# Patient Record
Sex: MALE | Race: WHITE | NOT HISPANIC OR LATINO | Employment: FULL TIME | URBAN - METROPOLITAN AREA
[De-identification: names, ages, dates, MRNs, and addresses within clinical notes are randomized per-mention and may not be internally consistent; named-entity substitution may affect disease eponyms.]

---

## 2021-07-23 ENCOUNTER — TELEPHONE (OUTPATIENT)
Dept: RHEUMATOLOGY | Facility: CLINIC | Age: 66
End: 2021-07-23

## 2025-01-17 DIAGNOSIS — M25.562 LEFT KNEE PAIN, UNSPECIFIED CHRONICITY: ICD-10-CM

## 2025-01-17 DIAGNOSIS — M25.552 LEFT HIP PAIN: Primary | ICD-10-CM

## 2025-01-20 ENCOUNTER — TELEPHONE (OUTPATIENT)
Dept: ORTHOPEDICS | Facility: CLINIC | Age: 70
End: 2025-01-20
Payer: MEDICARE

## 2025-01-20 NOTE — TELEPHONE ENCOUNTER
Left voicemail that we need to reschedule appointment.     ----- Message from Juve sent at 1/18/2025  8:08 AM CST -----  Type:  Call    Who Called:pt   Does the patient know what this is regarding?:appt   Would the patient rather a call back or a response via [facility]? Call   Best Call Back Number: [phone]  Additional Information:

## 2025-01-27 ENCOUNTER — TELEPHONE (OUTPATIENT)
Dept: ORTHOPEDICS | Facility: CLINIC | Age: 70
End: 2025-01-27
Payer: MEDICARE

## 2025-01-27 NOTE — TELEPHONE ENCOUNTER
----- Message from Tech Tito sent at 1/27/2025  1:06 PM CST -----  Regarding: FW: Appt  Contact: [phone]  Please help patient reschedule. Thank you!  ----- Message -----  From: Ge Hill  Sent: 1/27/2025  10:53 AM CST  To: Edna Chapin Staff  Subject: Appt                                             Patient is calling to reschedule appointment due to his car in is the shop she can come next weeks he can not make in on February 5 but able to come in any other day. Please contact pt

## 2025-01-30 DIAGNOSIS — M25.562 LEFT KNEE PAIN, UNSPECIFIED CHRONICITY: ICD-10-CM

## 2025-01-30 DIAGNOSIS — M25.552 LEFT HIP PAIN: Primary | ICD-10-CM

## 2025-02-07 NOTE — PROGRESS NOTES
[address], [place] [address]   Phone [phone]  Fax [phone]           CHIEF COMPLAINT: Pain of the Right Knee and Pain of the Left Knee       HISTORY OF PRESENT ILLNESS (JN) (02/10/2025):    History of Present Illness    HPI:  Arik presents with bilateral knee pain, primarily affecting the right knee. He reports constant right knee pain, particularly when kneeling, walking upstairs, or attempting to run. His right kneecap has dislocated about 3 times within two months without apparent cause. He mentions a history of right kneecap dislocation when he was younger.    He reports swelling in the right knee, describing an incident where his hand swelled to 3 times its normal size, followed by significant knee swelling two days later. He sought medical attention for this, but no infection was found. Fluid was drained from the right knee during this visit.    Regarding the left knee, he states it's very problematic, but both knees are causing issues. He has been receiving ozone injections in both knees from a practitioner named Shahrzad, which provide temporary pain relief lasting a few days.    Arik describes pain in his left hip and leg, which started about a month and a half ago. The pain extended from his hip down the left side of his leg but resolved after about a month. 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 A biopsy of the lipoma was performed and came back non-malignant.    He mentions a history of back problems, recalling instances where he experienced severe immobility and once had to call his son for help getting up off the floor. He attributes these issues to heavy weightlifting during his teenage years and 20s.    Arik reports a recent history of severe pneumonia following COVID-19, which led to a cardiology consultation. No cardiac issues were found, but a new cardiologist prescribed medication,  "which he declined to take.    He denies any recent falls or direct trauma to the knees. He also denies any allergies.    PREVIOUS TREATMENTS:  - Ozone shots in both knees: Provide pain relief for a few days  - Knee brace with a gel [place]: Used    MEDICATIONS:  - Aleve: Never been effective for the patient  - Advil: Arik has avoided taking    ALLERGIES:  - Arik reports no known allergies. However, he had a reaction to contrast during an imaging procedure, experiencing severe nausea and dry heaving.          PAST MEDICAL HISTORY:    History reviewed. No pertinent past medical history.    Hemoglobin A1C   Date Value Ref Range Status   06/19/2021 5.9 <=6.5 % Final        PAST SURGICAL HISTORY:    Past Surgical History:   Procedure Laterality Date    HERNIA REPAIR          MEDICATIONS:    Current Outpatient Medications:     ascorbic acid,sod/zinc gluc,ox (ZINC AND C ORAL), Take by mouth., Disp: , Rfl:     ergocalciferol, vitamin D2, (VITAMIN D ORAL), Take by mouth Daily., Disp: , Rfl:     meloxicam (MOBIC) 15 MG tablet, Take 1 tablet (15 mg total) by mouth once daily. with meals, Disp: 14 tablet, Rfl: 1     There are no discontinued medications.      ALLERGIES:     Review of patient's allergies indicates:  No Known Allergies         FAMILY HISTORY:   No family history on file.        SOCIAL HISTORY:    Social History     Socioeconomic History    Marital status:    Tobacco Use    Smoking status: Never    Smokeless tobacco: Never       PHYSICAL EXAMINATION:     Estimated body mass index is 25.6 kg/m² as calculated from the following:    Height as of this encounter: 5' 6.5" (1.689 m).    Weight as of this encounter: 73 kg (161 lb).     MUSCULOSKELETAL:        Knee Exam (right extremity):   Inspection:    Skin:    Discoloration   (-)   Gross deformity   (-)    Open wounds   (-)   Surgical scars   (-)      Soft tissue:     Swelling/Knee Effusion  (+) large effusion   Muscle atrophy   (-)   Palpation tenderness:  "   Bony:     Tibial tubercle   (-)   Patella    (-)   Tibial plateau   (-)   Femoral condyle  (-)   Soft tissue:    Pes anserine bursae  (-)   Patellar tendon   (-)   Quadriceps tendon  (-)   MCL    (-)   LCL    (-)  ITB    (-)   ROM:   Affected extremity:        Extension:   0°            Flexion    130°            Pain    (+)           Crepitance   (+)           Sensory:     Normal sensation to light touch in Sa/Patel/DP/SP/T nerve distributions      Motor:                  Fires EHL/FHL/Tibialis anterior/Gastrocsoleus   Vascular:                 Foot WWP with brisk capillary refill      DP/PT pulses palpable   Special tests:    Collateral ligaments:     Stable to varus and valgus stress at 0° and 30° of knee flexion      Cruciate ligaments:   Lachman   (-)   Pivot shift   (-)   Anterior drawer   (-)   Posterior drawer  (-)  Menisci:     Medial JLT   (+)   Lateral JLT   (-)    Apley/Denias test  (-)  Patella:    Patellar grind/Olmstead's Test (+)   Patellar apprehension  (-).        IMAGING:      X-Ray Knee 1 or 2 View Right  Narrative: EXAMINATION:  XR KNEE 1 OR 2 VIEW RIGHT    CLINICAL HISTORY:  Pain in unspecified knee    TECHNIQUE:  Lateral and sunrise views of the knees.    COMPARISON:  Views of the bilateral knees from the same day.    FINDINGS:  No definitive acute fracture.  There is a severe joint space narrowing in the patellofemoral joint with bone on bone apposition.  Atherosclerotic disease.  Impression: As above.    Electronically signed by: Kodak Winters  Date:    02/10/2025  Time:    12:11  X-ray Knee Ortho Left with Flexion  Narrative: EXAMINATION:  XR KNEE ORTHO LEFT WITH FLEXION    CLINICAL HISTORY:  . Pain in left knee    TECHNIQUE:  AP standing view of both knees, PA flexion standing views of both knees, and Merchant views of both knees were performed. A lateral view of the left knee was also performed.    COMPARISON:  None    FINDINGS:  No evidence of acute fracture    Right: Moderate to severe  tricompartmental hypertrophy and joint space narrowing worst in the lateral compartment.  Patellar spurring.  Large knee joint effusion.  Atherosclerotic disease.    Left: Severe tricompartmental joint space narrowing and hypertrophy with bone-on-bone apposition in the lateral compartment.  Impression: As above.    Electronically signed by: Kodak Winters  Date:    02/10/2025  Time:    11:40  X-Ray Hip 2 or 3 views Left with Pelvis when performed  Narrative: EXAMINATION:  XR HIP WITH PELVIS WHEN PERFORMED 2 OR 3 VIEWS LEFT    CLINICAL HISTORY:  Pain in left hip    TECHNIQUE:  XR HIP WITH PELVIS WHEN PERFORMED 2 OR 3 VIEWS LEFT    COMPARISON:  None.    FINDINGS:  No evidence of acute fracture.  Mild degenerative changes and joint space narrowing of the bilateral hips.  Lumbosacral spondylosis.  Degenerative changes of bilateral SI joints.    Osseous demineralization.  Atherosclerotic disease.  Impression: As above.    Electronically signed by: Kodak Winters  Date:    02/10/2025  Time:    11:38           ASSESSMENT: 69 y.o. 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  Left knee pain and arthritis    PLAN:  Data:  I independently visualized xray images today  I reviewed available old/outside records  Medications:  We are going to try 2 week course of meloxicam  He reports that a leave an Advil have never been effective for him  DME and weightbearing status:  He was placed into a Shields brace today on his right knee  Injections/Procedures:  He received a right knee steroid injection today  If his right knee steroid injection works he would be a candidate for a left knee steroid injection at his last visit  Advanced imaging:  Need MRI of the left hip to evaluate the tumor.  He is not a candidate for IV contrast due to intolerance of dye  Referral:  We will need to pathology  report and operative reports for the surgery on his hip done 2 years ago and addendum spring Education:  I had a long discussion with the patient about the causes, treatments, and prognosis/natural history for knee arthritis.  We discussed effective ways to improve symptoms as well as what types of activities may make the symptoms/prognosis worse. We discussed signs and symptoms and other reasons to return to clinic sooner.  Return to clinic:  After MRI and for left knee shot  Images needed at next visit:  None.  At this time we will further evaluate his hip  15 minutes were spent sizing, fitting, and educating for durable medical equipment application today.  [id number].      This note was generated with the assistance of ambient listening technology. Verbal consent was obtained by the patient and accompanying visitor(s) for the recording of patient appointment to facilitate this note. I attest to having reviewed and edited the generated note for accuracy, though some syntax or spelling errors may persist. Please contact the author of this note for any clarification.              Physician Signature: Dari Bishop M.D.       Official Website  Schedule An Appointment

## 2025-02-10 ENCOUNTER — TELEPHONE (OUTPATIENT)
Dept: ORTHOPEDICS | Facility: CLINIC | Age: 70
End: 2025-02-10
Payer: MEDICARE

## 2025-02-10 ENCOUNTER — HOSPITAL ENCOUNTER (OUTPATIENT)
Dept: RADIOLOGY | Facility: HOSPITAL | Age: 70
Discharge: HOME | End: 2025-02-10
Attending: ORTHOPAEDIC SURGERY
Payer: MEDICARE

## 2025-02-10 ENCOUNTER — OFFICE VISIT (OUTPATIENT)
Dept: ORTHOPEDICS | Facility: CLINIC | Age: 70
End: 2025-02-10
Attending: ORTHOPAEDIC SURGERY
Payer: MEDICARE

## 2025-02-10 VITALS — HEIGHT: 67 IN | BODY MASS INDEX: 25.27 KG/M2 | WEIGHT: 161 LBS

## 2025-02-10 DIAGNOSIS — M25.569 KNEE PAIN, UNSPECIFIED CHRONICITY, UNSPECIFIED LATERALITY: ICD-10-CM

## 2025-02-10 DIAGNOSIS — M25.552 LEFT HIP PAIN: ICD-10-CM

## 2025-02-10 DIAGNOSIS — M25.552 LEFT HIP PAIN: Primary | ICD-10-CM

## 2025-02-10 DIAGNOSIS — M25.562 LEFT KNEE PAIN, UNSPECIFIED CHRONICITY: ICD-10-CM

## 2025-02-10 DIAGNOSIS — M17.11 ARTHRITIS OF RIGHT KNEE: ICD-10-CM

## 2025-02-10 DIAGNOSIS — M25.552 PAIN OF LEFT HIP: Primary | ICD-10-CM

## 2025-02-10 PROCEDURE — 73502 X-RAY EXAM HIP UNI 2-3 VIEWS: CPT | Mod: TC,LT

## 2025-02-10 PROCEDURE — 73560 X-RAY EXAM OF KNEE 1 OR 2: CPT | Mod: 26,RT,, | Performed by: STUDENT IN AN ORGANIZED HEALTH CARE EDUCATION/TRAINING PROGRAM

## 2025-02-10 PROCEDURE — 73502 X-RAY EXAM HIP UNI 2-3 VIEWS: CPT | Mod: 26,LT,, | Performed by: STUDENT IN AN ORGANIZED HEALTH CARE EDUCATION/TRAINING PROGRAM

## 2025-02-10 PROCEDURE — 73562 X-RAY EXAM OF KNEE 3: CPT | Mod: TC,RT

## 2025-02-10 PROCEDURE — 73562 X-RAY EXAM OF KNEE 3: CPT | Mod: 26,59,RT, | Performed by: STUDENT IN AN ORGANIZED HEALTH CARE EDUCATION/TRAINING PROGRAM

## 2025-02-10 PROCEDURE — 73564 X-RAY EXAM KNEE 4 OR MORE: CPT | Mod: 26,LT,, | Performed by: STUDENT IN AN ORGANIZED HEALTH CARE EDUCATION/TRAINING PROGRAM

## 2025-02-10 PROCEDURE — 99999 PR PBB SHADOW E&M-EST. PATIENT-LVL III: CPT | Mod: PBBFAC,,, | Performed by: ORTHOPAEDIC SURGERY

## 2025-02-10 PROCEDURE — 73560 X-RAY EXAM OF KNEE 1 OR 2: CPT | Mod: TC,RT

## 2025-02-10 RX ADMIN — BETAMETHASONE SODIUM PHOSPHATE AND BETAMETHASONE ACETATE 6 MG: 3; 3 INJECTION, SUSPENSION INTRA-ARTICULAR; INTRALESIONAL; INTRAMUSCULAR; SOFT TISSUE at 11:02

## 2025-02-11 RX ORDER — MELOXICAM 15 MG/1
15 TABLET ORAL DAILY
Qty: 14 TABLET | Refills: 1 | Status: SHIPPED | OUTPATIENT
Start: 2025-02-11

## 2025-02-12 ENCOUNTER — TELEPHONE (OUTPATIENT)
Dept: ORTHOPEDICS | Facility: CLINIC | Age: 70
End: 2025-02-12
Payer: MEDICARE

## 2025-02-12 NOTE — TELEPHONE ENCOUNTER
Patient can't remember his surgeon, but plans to go  his operative report before his appointment next week.     ----- Message from Girl Meets Dress sent at 2/10/2025  4:00 PM CST -----  Contact: patient  Type:  Patient Returning Call    Who Called:Arik Andrade   Who Left Message for Patient: Tito   Does the patient know what this is regarding?: his past surgery   Would the patient rather a call back or a response via [facility]?  Call   Best Call Back Number: [phone]  Additional Information:

## 2025-02-14 ENCOUNTER — TELEPHONE (OUTPATIENT)
Dept: ORTHOPEDICS | Facility: CLINIC | Age: 70
End: 2025-02-14
Payer: MEDICARE

## 2025-02-14 RX ORDER — BETAMETHASONE SODIUM PHOSPHATE AND BETAMETHASONE ACETATE 3; 3 MG/ML; MG/ML
6 INJECTION, SUSPENSION INTRA-ARTICULAR; INTRALESIONAL; INTRAMUSCULAR; SOFT TISSUE
Status: DISCONTINUED | OUTPATIENT
Start: 2025-02-10 | End: 2025-02-14 | Stop reason: HOSPADM

## 2025-02-14 NOTE — TELEPHONE ENCOUNTER
Need request faxed to them at [phone] with medical release.     ----- Message from Fernie sent at 2/13/2025  2:02 PM CST -----  Contact: isis Mota  Type:  Needs Medical Advice    Who Called: Adry   Would the patient rather a call back or a response via [facility]? Call   Best Call Back Number: [phone]  Additional Information:  Adry is calling in regards to the request for the pt medical records. She states he has been 03/2023.

## 2025-02-14 NOTE — PROCEDURES
Large Joint Aspiration/Injection: R knee    Date/Time: 2/10/2025 11:40 AM    Performed by: Dari Bishop MD  Authorized by: Dari Bishop MD    Consent Done?:  Yes (Verbal)  Local anesthetic:  Bupivacaine 0.5% without epinephrine and lidocaine 1% without epinephrine  Anesthetic total (ml):  4    Ultrasonic Guidance for needle placement?: No    Approach:  Anterolateral  Location:  Knee  Site:  R knee  Medications:  6 mg betamethasone acetate-betamethasone sodium phosphate 6 mg/mL  Patient tolerance:  Patient tolerated the procedure well with no immediate complications

## 2025-02-19 ENCOUNTER — HOSPITAL ENCOUNTER (OUTPATIENT)
Dept: RADIOLOGY | Facility: HOSPITAL | Age: 70
Discharge: HOME | End: 2025-02-19
Attending: ORTHOPAEDIC SURGERY
Payer: MEDICARE

## 2025-02-19 DIAGNOSIS — M25.552 PAIN OF LEFT HIP: ICD-10-CM

## 2025-02-19 PROCEDURE — 73721 MRI JNT OF LWR EXTRE W/O DYE: CPT | Mod: TC,LT

## 2025-02-20 ENCOUNTER — OFFICE VISIT (OUTPATIENT)
Dept: ORTHOPEDICS | Facility: CLINIC | Age: 70
End: 2025-02-20
Payer: MEDICARE

## 2025-02-20 VITALS — WEIGHT: 160.94 LBS | BODY MASS INDEX: 25.59 KG/M2

## 2025-02-20 DIAGNOSIS — R22.42 MASS OF LEG, LEFT: ICD-10-CM

## 2025-02-20 DIAGNOSIS — M54.16 LUMBAR RADICULOPATHY: Primary | ICD-10-CM

## 2025-02-20 DIAGNOSIS — M17.12 ARTHRITIS OF LEFT KNEE: ICD-10-CM

## 2025-02-20 RX ORDER — BETAMETHASONE SODIUM PHOSPHATE AND BETAMETHASONE ACETATE 3; 3 MG/ML; MG/ML
6 INJECTION, SUSPENSION INTRA-ARTICULAR; INTRALESIONAL; INTRAMUSCULAR; SOFT TISSUE
Status: DISCONTINUED | OUTPATIENT
Start: 2025-02-20 | End: 2025-02-20 | Stop reason: HOSPADM

## 2025-02-20 RX ADMIN — BETAMETHASONE SODIUM PHOSPHATE AND BETAMETHASONE ACETATE 6 MG: 3; 3 INJECTION, SUSPENSION INTRA-ARTICULAR; INTRALESIONAL; INTRAMUSCULAR; SOFT TISSUE at 01:02

## 2025-02-20 NOTE — PROCEDURES
Large Joint Aspiration/Injection: L knee    Date/Time: 2/20/2025 1:20 PM    Performed by: Dari Bishop MD  Authorized by: Dari Bishop MD    Consent Done?:  Yes (Verbal)  Local anesthetic:  Bupivacaine 0.5% without epinephrine and lidocaine 1% without epinephrine  Anesthetic total (ml):  4    Ultrasonic Guidance for needle placement?: No    Approach:  Anterolateral  Location:  Knee  Site:  L knee  Medications:  6 mg betamethasone acetate-betamethasone sodium phosphate 6 mg/mL  Patient tolerance:  Patient tolerated the procedure well with no immediate complications

## 2025-02-20 NOTE — PROGRESS NOTES
[address] [facility] [place] [address]   Phone [phone]  Fax [phone]           CHIEF COMPLAINT: Pain of the Right Knee (5/10/) and Pain of the Left Knee (5/10)  HISTORY OF PRESENT ILLNESS  (02/20/2025):  Arik reports that his right knee injection did work but it did not alleviate all of his pain.  He has never had Visco supplement injections in his right knee.  Continuing to have issues with his left knee although it is not as painful as his right knee.  His left knee is mostly complicated by the amount of swelling he has not his knee.  He does not want to get the fluid drain though today.  He is also here to review the MRI of his left hip.  We also went over the biopsy results as well as his surgical pathology for the left hip.  He is having radicular symptoms going down his left leg but he denies having any back pain.  He denies any bowel or bladder changes.    HISTORY OF PRESENT ILLNESS (JN) (02/10/2025):    Arik presents with bilateral knee pain, primarily affecting the right knee. He reports constant right knee pain, particularly when kneeling, walking upstairs, or attempting to run. His right kneecap has dislocated about 3 times within two months without apparent cause. He mentions a history of right kneecap dislocation when he was younger.    He reports swelling in the right knee, describing an incident where his hand swelled to 3 times its normal size, followed by significant knee swelling two days later. He sought medical attention for this, but no infection was found. Fluid was drained from the right knee during this visit.    Regarding the left knee, he states it's very problematic, but both knees are causing issues. He has been receiving ozone injections in both knees from a practitioner named Shahrzad, which provide temporary pain relief lasting a few days.    Arik describes pain in his left hip and leg, which started about a month and a half ago. The pain  extended from his hip down the left side of his leg but resolved after about a month. 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 A biopsy of the lipoma was performed and came back non-malignant.    He mentions a history of back problems, recalling instances where he experienced severe immobility and once had to call his son for help getting up off the floor. He attributes these issues to heavy weightlifting during his teenage years and 20s.    Arik reports a recent history of severe pneumonia following COVID-19, which led to a cardiology consultation. No cardiac issues were found, but a new cardiologist prescribed medication, which he declined to take.    He denies any recent falls or direct trauma to the knees. He also denies any allergies.    PREVIOUS TREATMENTS:  - Ozone shots in both knees: Provide pain relief for a few days  - Knee brace with a gel [place]: Used    MEDICATIONS:  - Aleve: Never been effective for the patient  - Advil: Arik has avoided taking    ALLERGIES:  - Arik reports no known allergies. However, he had a reaction to contrast during an imaging procedure, experiencing severe nausea and dry heaving.      PAST MEDICAL HISTORY:    History reviewed. No pertinent past medical history.    Hemoglobin A1C   Date Value Ref Range Status   06/19/2021 5.9 <=6.5 % Final        PAST SURGICAL HISTORY:    Past Surgical History:   Procedure Laterality Date    HERNIA REPAIR          MEDICATIONS:    Current Outpatient Medications:     ascorbic acid,sod/zinc gluc,ox (ZINC AND C ORAL), Take by mouth. (Patient not taking: Reported on 2/20/2025), Disp: , Rfl:     ergocalciferol, vitamin D2, (VITAMIN D ORAL), Take by mouth Daily. (Patient not taking: Reported on 2/20/2025), Disp: , Rfl:     meloxicam (MOBIC) 15 MG tablet, Take 1 tablet (15 mg total) by mouth once daily. with meals (Patient not taking: Reported on 2/20/2025), Disp: 14 tablet, Rfl: 1  "    There are no discontinued medications.      ALLERGIES:     Review of patient's allergies indicates:  No Known Allergies         FAMILY HISTORY:   No family history on file.        SOCIAL HISTORY:    Social History     Socioeconomic History    Marital status:    Tobacco Use    Smoking status: Never    Smokeless tobacco: Never       PHYSICAL EXAMINATION:     Estimated body mass index is 25.59 kg/m² as calculated from the following:    Height as of 2/10/25: 5' 6.5" (1.689 m).    Weight as of this encounter: 73 kg (160 lb 15 oz).     Knee Exam (right extremity):   Inspection:    Skin:    Discoloration   (-)   Gross deformity   (-)    Open wounds   (-)   Surgical scars   (-)      Soft tissue:     Swelling/Knee Effusion  (+) mild effusion   Muscle atrophy   (-)   Palpation tenderness:    Bony:     Tibial tubercle   (-)   Patella    (-)   Tibial plateau   (-)   Femoral condyle  (-)   Soft tissue:    Pes anserine bursae  (-)   Patellar tendon   (-)   Quadriceps tendon  (-)   MCL    (-)   LCL    (-)  ITB    (-)   ROM:   Affected extremity:        Extension:   0°            Flexion    130°            Pain    (+)           Crepitance   (+)           Sensory:     Normal sensation to light touch in Sa/Patel/DP/SP/T nerve distributions      Motor:                  Fires EHL/FHL/Tibialis anterior/Gastrocsoleus   Vascular:                 Foot WWP with brisk capillary refill      DP/PT pulses palpable   Special tests:    Collateral ligaments:     Stable to varus and valgus stress at 0° and 30° of knee flexion      Cruciate ligaments:   Lachman   (-)   Pivot shift   (-)   Anterior drawer   (-)   Posterior drawer  (-)  Menisci:     Medial JLT   (+)   Lateral JLT   (-)    Apley/Denias test  (-)  Patella:    Patellar grind/Olmstead's Test (+)   Patellar apprehension  (-).    Knee Exam (left extremity):   Inspection:    Skin:    Discoloration   (-)   Gross deformity   (-)    Open wounds   (-)   Surgical scars   (-)      Soft " tissue:     Swelling/Knee Effusion  (+) large   Muscle atrophy   (-)   Palpation tenderness:    Bony:     Tibial tubercle   (-)   Patella    (-)   Tibial plateau   (-)   Femoral condyle  (-)   Soft tissue:    Pes anserine bursae  (-)   Patellar tendon   (-)   Quadriceps tendon  (-)   MCL    (-)   LCL    (-)  ITB    (-)   ROM:   Affected extremity:        Extension:   0°            Flexion    130°            Pain    (+) with deep flexion           Crepitance   (+)           Sensory:     Normal sensation to light touch in Sa/Patel/DP/SP/T nerve distributions      Motor:                  Fires EHL/FHL/Tibialis anterior/Gastrocsoleus   Vascular:                 Foot WWP with brisk capillary refill      DP/PT pulses palpable   Special tests:    Collateral ligaments:     Stable to varus and valgus stress at 0° and 30° of knee flexion      Cruciate ligaments:   Lachman   (-)   Pivot shift   (-)   Anterior drawer   (-)   Posterior drawer  (-)  Menisci:     Medial JLT   (+)   Lateral JLT   (-)    Apley/Denias test  (-)  Patella:    Patellar grind/Olmstead's Test (+)   Patellar apprehension  (-).        IMAGING:      MRI Hip Without Contrast Left  Narrative: EXAMINATION:  MRI HIP WITHOUT CONTRAST LEFT    CLINICAL HISTORY:  Pain in left hipLipoma left hip;    TECHNIQUE:  Non-contrast multiplanar, multisequence MR images of the left hip were obtained on a 1.5  [place] magnet.    COMPARISON:  02/10/2025.    FINDINGS:  Bones: No definitive evidence of infiltrating lesions.    Soft tissues: There is a 6 x 3.5 x  6.4 cm T1 hyperintense encapsulated lesion which shows decreased signal on fat saturated images.  There is mild hypointensity in a nodular region along the periphery of the lesion measuring 1.5 x 0.8 cm.  Lesion is situated in the lateral subcutaneous soft tissues on the lateral aspect of the proximal vastus lateralis.    Miscellaneous: Along the anterior superior wall of the bladder there is a T1 isointense T2 hyperintense  cystic lesion measuring 1.2 x 1 cm series 6, image 24).  Impression: 1. Fat suppressing lesion in the lateral soft tissues of the proximal thigh.  While these findings may correlate with lipoma.  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  Recommend completion of contrast enhanced images to assess for enhancement of this site.  2. Incidentally noted is a cystic nodularity along the anterior superior bladder may represent a irregular remnant.    Electronically signed by: Kodak Winters  Date:    02/20/2025  Time:    09:04           ASSESSMENT: 69 y.o. 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  Noncontrast MRI of left hip on 02/20/2025 demonstrates  1. Fat suppressing lesion in the lateral soft tissues of the proximal thigh.  While these findings may correlate with lipoma.  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  Recommend completion of contrast enhanced images to assess for enhancement of this site.  Incidentally noted is a cystic nodularity along the anterior superior bladder may represent a irregular remnant.  Left knee pain and arthritis with steroid injection 02/20/2025    PLAN:  Data:  I independently visualized MRI images today  I reviewed available old/outside records  Medications:  The 2 week course of meloxicam given to him in his last visit did help him  He reports that a leave an Advil have never been effective for him  DME and weightbearing status:  He was placed into a Shields brace today on his right knee  I offered him a hinged knee brace for his left side but he says he does not want that  Injections/Procedures:  He received a right knee steroid injection at his last visit which worked well  He  received a left knee steroid injection today.  Of note patient did not want a left knee aspiration.  Referral:  I reviewed his pathology and operative reports for his left hip mass biopsy surgery.  Pathology demonstrated a lipoma.  They have been scanned into the chart.  He will need to see Dr. Renaldo Pritchard for evaluation of resection of his left hip mass.  I suspect that the atypical feature shown on the MRI as a result of the prior biopsy but I would like Dr. Pritchard to treat this patient in case there is something more worrisome.  Of note his MRI is noncontrast due to his tolerance for IV dye.    To pain management clinic for evaluation of lumbar radiculopathy on his left side  Return to clinic:  With me in 4-6 weeks.  However, if patient calls prior to then he may want Visco supplement injections with Jennifer  Images needed at next visit:  None.        This note was generated with the assistance of ambient listening technology. Verbal consent was obtained by the patient and accompanying visitor(s) for the recording of patient appointment to facilitate this note. I attest to having reviewed and edited the generated note for accuracy, though some syntax or spelling errors may persist. Please contact the author of this note for any clarification.              Physician Signature: Dari Bishop M.D.       Official Website  Schedule An Appointment

## 2025-02-24 ENCOUNTER — TELEPHONE (OUTPATIENT)
Dept: ORTHOPEDICS | Facility: CLINIC | Age: 70
End: 2025-02-24
Payer: MEDICARE

## 2025-02-24 NOTE — TELEPHONE ENCOUNTER
----- Message from Laverne sent at 2/24/2025 10:37 AM CST -----  Contact: Arik Elise called in to speak with the office regards to the referral he got he wants to know where it was sent to so that he can call and follow up, Please call her at  [phone].ThanksTd

## 2025-03-07 DIAGNOSIS — M17.12 ARTHRITIS OF LEFT KNEE: Primary | ICD-10-CM

## 2025-03-07 DIAGNOSIS — M17.11 ARTHRITIS OF RIGHT KNEE: ICD-10-CM

## 2025-03-27 NOTE — PROGRESS NOTES
[address] [facility] [place] [address]   Phone [phone]  Fax [phone]           CHIEF COMPLAINT: Pain of the Right Knee (Bilateral Synvisc (1/3)) and Pain of the Left Knee (Bilateral Synvisc (1/3))      HISTORY OF PRESENT ILLNESS BN (03/28/2025):  Arik presents today for bilateral knee Synvisc injections.  This will be his 1st time receiving Visco injections.  He has elected for a series of 3 injections.  He rates his pain 3/10 today.  He has been receiving ozone injections in his bilateral knees for some time which he reports is helpful but only provides short-lived relief.  He is hoping for longer lasting relief from Visco supplementation.    HISTORY OF PRESENT ILLNESS JN (02/20/2025):  Arik reports that his right knee injection did work but it did not alleviate all of his pain.  He has never had Visco supplement injections in his right knee.  Continuing to have issues with his left knee although it is not as painful as his right knee.  His left knee is mostly complicated by the amount of swelling he has not his knee.  He does not want to get the fluid drain though today.  He is also here to review the MRI of his left hip.  We also went over the biopsy results as well as his surgical pathology for the left hip.  He is having radicular symptoms going down his left leg but he denies having any back pain.  He denies any bowel or bladder changes.    HISTORY OF PRESENT ILLNESS (JN) (02/10/2025):    Arik presents with bilateral knee pain, primarily affecting the right knee. He reports constant right knee pain, particularly when kneeling, walking upstairs, or attempting to run. His right kneecap has dislocated about 3 times within two months without apparent cause. He mentions a history of right kneecap dislocation when he was younger.    He reports swelling in the right knee, describing an incident where his hand swelled to 3 times its normal size, followed by significant knee  swelling two days later. He sought medical attention for this, but no infection was found. Fluid was drained from the right knee during this visit.    Regarding the left knee, he states it's very problematic, but both knees are causing issues. He has been receiving ozone injections in both knees from a practitioner named Shahrzad, which provide temporary pain relief lasting a few days.    Arik describes pain in his left hip and leg, which started about a month and a half ago. The pain extended from his hip down the left side of his leg but resolved after about a month. 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 A biopsy of the lipoma was performed and came back non-malignant.    He mentions a history of back problems, recalling instances where he experienced severe immobility and once had to call his son for help getting up off the floor. He attributes these issues to heavy weightlifting during his teenage years and 20s.    Arik reports a recent history of severe pneumonia following COVID-19, which led to a cardiology consultation. No cardiac issues were found, but a new cardiologist prescribed medication, which he declined to take.    He denies any recent falls or direct trauma to the knees. He also denies any allergies.    PREVIOUS TREATMENTS:  - Ozone shots in both knees: Provide pain relief for a few days  - Knee brace with a gel [place]: Used    MEDICATIONS:  - Aleve: Never been effective for the patient  - Advil: Arik has avoided taking    ALLERGIES:  - Arik reports no known allergies. However, he had a reaction to contrast during an imaging procedure, experiencing severe nausea and dry heaving.      PAST MEDICAL HISTORY:    History reviewed. No pertinent past medical history.    Hemoglobin A1C   Date Value Ref Range Status   06/19/2021 5.9 <=6.5 % Final        PAST SURGICAL HISTORY:    Past Surgical History:   Procedure Laterality Date     "HERNIA REPAIR          MEDICATIONS:    Current Outpatient Medications:     ascorbic acid,sod/zinc gluc,ox (ZINC AND C ORAL), Take by mouth. (Patient not taking: Reported on 3/28/2025), Disp: , Rfl:     ergocalciferol, vitamin D2, (VITAMIN D ORAL), Take by mouth Daily. (Patient not taking: Reported on 3/28/2025), Disp: , Rfl:     meloxicam (MOBIC) 15 MG tablet, Take 1 tablet (15 mg total) by mouth once daily. with meals (Patient not taking: Reported on 3/28/2025), Disp: 14 tablet, Rfl: 1     There are no discontinued medications.      ALLERGIES:     Review of patient's allergies indicates:  No Known Allergies         FAMILY HISTORY:   No family history on file.        SOCIAL HISTORY:    Social History     Socioeconomic History    Marital status:    Tobacco Use    Smoking status: Never    Smokeless tobacco: Never       PHYSICAL EXAMINATION:     Estimated body mass index is 25.44 kg/m² as calculated from the following:    Height as of this encounter: 5' 6.5" (1.689 m).    Weight as of this encounter: 72.6 kg (160 lb).   Deferred        IMAGING:      MRI Hip Without Contrast Left  Narrative: EXAMINATION:  MRI HIP WITHOUT CONTRAST LEFT    CLINICAL HISTORY:  Pain in left hipLipoma left hip;    TECHNIQUE:  Non-contrast multiplanar, multisequence MR images of the left hip were obtained on a 1.5  Manasa magnet.    COMPARISON:  02/10/2025.    FINDINGS:  Bones: No definitive evidence of infiltrating lesions.    Soft tissues: There is a 6 x 3.5 x  6.4 cm T1 hyperintense encapsulated lesion which shows decreased signal on fat saturated images.  There is mild hypointensity in a nodular region along the periphery of the lesion measuring 1.5 x 0.8 cm.  Lesion is situated in the lateral subcutaneous soft tissues on the lateral aspect of the proximal vastus lateralis.    Miscellaneous: Along the anterior superior wall of the bladder there is a T1 isointense T2 hyperintense cystic lesion measuring 1.2 x 1 cm series 6, image " 24).  Impression: 1. Fat suppressing lesion in the lateral soft tissues of the proximal thigh.  While these findings may correlate with lipoma.  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  Recommend completion of contrast enhanced images to assess for enhancement of this site.  2. Incidentally noted is a cystic nodularity along the anterior superior bladder may represent a irregular remnant.    Electronically signed by: Kodak Winters  Date:    02/20/2025  Time:    09:04           ASSESSMENT: 69 y.o. male  with:   Right knee pain worsened patellofemoral compartment status post steroid injection 02/10/2025 with some improvement.  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  Noncontrast MRI of left hip on 02/20/2025 demonstrates  1. Fat suppressing lesion in the lateral soft tissues of the proximal thigh.  While these findings may correlate with lipoma.  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  Recommend completion of contrast enhanced images to assess for enhancement of this site.  Incidentally noted is a cystic nodularity along the anterior superior bladder may represent a irregular remnant.  Left knee pain and arthritis with steroid injection 02/20/2025  Bilateral knee Synvisc 1/3 March 2025    PLAN:  Data:  I reviewed available old/outside records  Medications:  Meloxicam as needed for pain.  DME and weightbearing status:  Activities as tolerated.  He has a Shields knee brace to wear as needed for comfort for his right knee.  Injections/Procedures:  Bilateral knee Synvisc injections administered today. Patient tolerated the procedure well.   Education:  Medication side effects: Possible side effects of medications prescribed/continued today discussed. If patient experiences any unwanted side effects of medications prescribed, patient knows  to notify physician immediately.   Return to clinic:  Next week for visco 2/3  Images needed at next visit:  None.

## 2025-03-28 ENCOUNTER — PROCEDURE VISIT (OUTPATIENT)
Dept: ORTHOPEDICS | Facility: CLINIC | Age: 70
End: 2025-03-28
Payer: MEDICARE

## 2025-03-28 VITALS — HEIGHT: 67 IN | BODY MASS INDEX: 25.11 KG/M2 | WEIGHT: 160 LBS

## 2025-03-28 DIAGNOSIS — M17.12 ARTHRITIS OF LEFT KNEE: Primary | ICD-10-CM

## 2025-03-28 DIAGNOSIS — M17.11 ARTHRITIS OF RIGHT KNEE: ICD-10-CM

## 2025-03-28 NOTE — PROCEDURES
Large Joint Aspiration/Injection: bilateral knee    Date/Time: 3/28/2025 10:30 AM    Performed by: Jennifer Quinonez PA-C  Authorized by: Jennifer Quinonez PA-C    Consent Done?:  Yes (Verbal)  Indications:  Arthritis  Site marked: the procedure site was marked    Timeout: prior to procedure the correct patient, procedure, and site was verified    Local anesthesia used?: No      Details:  Needle Size:  22 G  Ultrasonic Guidance for needle placement?: No    Approach:  Anterolateral  Location:  Knee  Laterality:  Bilateral  Site:  Bilateral knee  Medications (Right):  16 mg hyaluronate 16 mg/2 mL  Medications (Left):  16 mg hyaluronate 16 mg/2 mL  Patient tolerance:  Patient tolerated the procedure well with no immediate complications

## 2025-03-28 NOTE — PROGRESS NOTES
[address] [facility] [address] [place] [address]   Phone [phone]  Fax [phone]           CHIEF COMPLAINT: Pain of the Left Knee (Bilateral synvisc (2/3)) and Pain of the Right Knee (Bilateral synvisc (2/3))      HISTORY OF PRESENT ILLNESS BN (04/04/2025):  Arik presents to clinic for bilateral knee Synvisc injections 2/3. He reports increased pain in his right knee after the injection last week. He reports 0/10 pain today.     HISTORY OF PRESENT ILLNESS BN (03/28/2025):  Arik presents today for bilateral knee Synvisc injections.  This will be his 1st time receiving Visco injections.  He has elected for a series of 3 injections.  He rates his pain 3/10 today.  He has been receiving ozone injections in his bilateral knees for some time which he reports is helpful but only provides short-lived relief.  He is hoping for longer lasting relief from Visco supplementation.    HISTORY OF PRESENT ILLNESS JN (02/20/2025):  Arik reports that his right knee injection did work but it did not alleviate all of his pain.  He has never had Visco supplement injections in his right knee.  Continuing to have issues with his left knee although it is not as painful as his right knee.  His left knee is mostly complicated by the amount of swelling he has not his knee.  He does not want to get the fluid drain though today.  He is also here to review the MRI of his left hip.  We also went over the biopsy results as well as his surgical pathology for the left hip.  He is having radicular symptoms going down his left leg but he denies having any back pain.  He denies any bowel or bladder changes.    HISTORY OF PRESENT ILLNESS (JN) (02/10/2025):    Arik presents with bilateral knee pain, primarily affecting the right knee. He reports constant right knee pain, particularly when kneeling, walking upstairs, or attempting to run. His right kneecap has dislocated about 3 times within two months without apparent  cause. He mentions a history of right kneecap dislocation when he was younger.    He reports swelling in the right knee, describing an incident where his hand swelled to 3 times its normal size, followed by significant knee swelling two days later. He sought medical attention for this, but no infection was found. Fluid was drained from the right knee during this visit.    Regarding the left knee, he states it's very problematic, but both knees are causing issues. He has been receiving ozone injections in both knees from a practitioner named Shahrzad, which provide temporary pain relief lasting a few days.    Arik describes pain in his left hip and leg, which started about a month and a half ago. The pain extended from his hip down the left side of his leg but resolved after about a month. 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 A biopsy of the lipoma was performed and came back non-malignant.    He mentions a history of back problems, recalling instances where he experienced severe immobility and once had to call his son for help getting up off the floor. He attributes these issues to heavy weightlifting during his teenage years and 20s.    Arik reports a recent history of severe pneumonia following COVID-19, which led to a cardiology consultation. No cardiac issues were found, but a new cardiologist prescribed medication, which he declined to take.    He denies any recent falls or direct trauma to the knees. He also denies any allergies.    PREVIOUS TREATMENTS:  - Ozone shots in both knees: Provide pain relief for a few days  - Knee brace with a gel [place]: Used    MEDICATIONS:  - Aleve: Never been effective for the patient  - Advil: Arik has avoided taking    ALLERGIES:  - Arik reports no known allergies. However, he had a reaction to contrast during an imaging procedure, experiencing severe nausea and dry heaving.      PAST MEDICAL HISTORY:   "  No past medical history on file.    Hemoglobin A1C   Date Value Ref Range Status   06/19/2021 5.9 <=6.5 % Final        PAST SURGICAL HISTORY:    Past Surgical History:   Procedure Laterality Date    HERNIA REPAIR          MEDICATIONS:    Current Outpatient Medications:     ascorbic acid,sod/zinc gluc,ox (ZINC AND C ORAL), Take by mouth. (Patient not taking: Reported on 2/20/2025), Disp: , Rfl:     ergocalciferol, vitamin D2, (VITAMIN D ORAL), Take by mouth Daily. (Patient not taking: Reported on 2/20/2025), Disp: , Rfl:     meloxicam (MOBIC) 15 MG tablet, Take 1 tablet (15 mg total) by mouth once daily. with meals (Patient not taking: Reported on 2/20/2025), Disp: 14 tablet, Rfl: 1     There are no discontinued medications.      ALLERGIES:     Review of patient's allergies indicates:  No Known Allergies         FAMILY HISTORY:   No family history on file.        SOCIAL HISTORY:    Social History     Socioeconomic History    Marital status:    Tobacco Use    Smoking status: Never    Smokeless tobacco: Never       PHYSICAL EXAMINATION:     Estimated body mass index is 25.44 kg/m² as calculated from the following:    Height as of 3/28/25: 5' 6.5" (1.689 m).    Weight as of 3/28/25: 72.6 kg (160 lb).   Deferred        IMAGING:      MRI Hip Without Contrast Left  Narrative: EXAMINATION:  MRI HIP WITHOUT CONTRAST LEFT    CLINICAL HISTORY:  Pain in left hipLipoma left hip;    TECHNIQUE:  Non-contrast multiplanar, multisequence MR images of the left hip were obtained on a 1.5  Manasa magnet.    COMPARISON:  02/10/2025.    FINDINGS:  Bones: No definitive evidence of infiltrating lesions.    Soft tissues: There is a 6 x 3.5 x  6.4 cm T1 hyperintense encapsulated lesion which shows decreased signal on fat saturated images.  There is mild hypointensity in a nodular region along the periphery of the lesion measuring 1.5 x 0.8 cm.  Lesion is situated in the lateral subcutaneous soft tissues on the lateral aspect of the " proximal vastus lateralis.    Miscellaneous: Along the anterior superior wall of the bladder there is a T1 isointense T2 hyperintense cystic lesion measuring 1.2 x 1 cm series 6, image 24).  Impression: 1. Fat suppressing lesion in the lateral soft tissues of the proximal thigh.  While these findings may correlate with lipoma.  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  Recommend completion of contrast enhanced images to assess for enhancement of this site.  2. Incidentally noted is a cystic nodularity along the anterior superior bladder may represent a irregular remnant.    Electronically signed by: Kodak Winters  Date:    02/20/2025  Time:    09:04           ASSESSMENT: 69 y.o. male  with:   Right knee pain worsened patellofemoral compartment status post steroid injection 02/10/2025 with some improvement.  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  Noncontrast MRI of left hip on 02/20/2025 demonstrates  1. Fat suppressing lesion in the lateral soft tissues of the proximal thigh.  While these findings may correlate with lipoma.  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  Recommend completion of contrast enhanced images to assess for enhancement of this site.  Incidentally noted is a cystic nodularity along the anterior superior bladder may represent a irregular remnant.  Left knee pain and arthritis with steroid injection 02/20/2025  Bilateral knee Synvisc April 2025    PLAN:  Data:  I reviewed available old/outside records  Medications:  Meloxicam as needed for pain.  DME and weightbearing status:  Activities as tolerated.  He has a Shields knee brace to wear as needed for comfort for his right knee.  Injections/Procedures:  Bilateral knee Synvisc injections administered today. Patient tolerated the procedure well.    Education:  Medication side effects: Possible side effects of medications prescribed/continued today discussed. If patient experiences any unwanted side effects of medications prescribed, patient knows to notify physician immediately.   Return to clinic:  Next week for visco 3/3  Images needed at next visit:  None.

## 2025-04-04 ENCOUNTER — PROCEDURE VISIT (OUTPATIENT)
Dept: ORTHOPEDICS | Facility: CLINIC | Age: 70
End: 2025-04-04
Payer: MEDICARE

## 2025-04-04 DIAGNOSIS — M17.0 BILATERAL PRIMARY OSTEOARTHRITIS OF KNEE: Primary | ICD-10-CM

## 2025-04-04 NOTE — PROCEDURES
Large Joint Aspiration/Injection: bilateral knee    Date/Time: 4/4/2025 12:00 PM    Performed by: Jennifer Quinonez PA-C  Authorized by: Jennifer Quinonez PA-C    Consent Done?:  Yes (Verbal)  Indications:  Arthritis  Site marked: the procedure site was marked    Timeout: prior to procedure the correct patient, procedure, and site was verified    Local anesthesia used?: No      Details:  Needle Size:  22 G  Ultrasonic Guidance for needle placement?: No    Approach:  Anteromedial  Location:  Knee  Laterality:  Bilateral  Site:  Bilateral knee  Medications (Right):  16 mg hyaluronate 16 mg/2 mL  Medications (Left):  16 mg hyaluronate 16 mg/2 mL  Patient tolerance:  Patient tolerated the procedure well with no immediate complications     [facility] 2/3

## 2025-04-08 NOTE — PROGRESS NOTES
[address] [facility] [place] [address]   Phone [phone]  Fax [phone]           CHIEF COMPLAINT: Pain of the Left Knee (Bilateral Synvisc (3/3)/Pain:0/10) and Pain of the Right Knee (Bilateral Synvisc (3/3)/Pain:0/10)      HISTORY OF PRESENT ILLNESS BN (04/15/2025):  Arik presents to clinic for bilateral knee Synvisc injections 3/3. He reports improvement in his knee pain since his last visit. He reports 0/10 knee pain today. He also receives Ozone injections in his bilateral knees and has resumed these injections which he reports has been helpful.       HISTORY OF PRESENT ILLNESS BN (04/04/2025):  Arik presents to clinic for bilateral knee Synvisc injections 2/3. He reports increased pain in his right knee after the injection last week. He reports 0/10 pain today.       HISTORY OF PRESENT ILLNESS BN (03/28/2025):  Arik presents today for bilateral knee Synvisc injections.  This will be his 1st time receiving Visco injections.  He has elected for a series of 3 injections.  He rates his pain 3/10 today.  He has been receiving ozone injections in his bilateral knees for some time which he reports is helpful but only provides short-lived relief.  He is hoping for longer lasting relief from Visco supplementation.    HISTORY OF PRESENT ILLNESS JN (02/20/2025):  Arik reports that his right knee injection did work but it did not alleviate all of his pain.  He has never had Visco supplement injections in his right knee.  Continuing to have issues with his left knee although it is not as painful as his right knee.  His left knee is mostly complicated by the amount of swelling he has not his knee.  He does not want to get the fluid drain though today.  He is also here to review the MRI of his left hip.  We also went over the biopsy results as well as his surgical pathology for the left hip.  He is having radicular symptoms going down his left leg but he denies having any back pain.   He denies any bowel or bladder changes.    HISTORY OF PRESENT ILLNESS (JN) (02/10/2025):    Arik presents with bilateral knee pain, primarily affecting the right knee. He reports constant right knee pain, particularly when kneeling, walking upstairs, or attempting to run. His right kneecap has dislocated about 3 times within two months without apparent cause. He mentions a history of right kneecap dislocation when he was younger.    He reports swelling in the right knee, describing an incident where his hand swelled to 3 times its normal size, followed by significant knee swelling two days later. He sought medical attention for this, but no infection was found. Fluid was drained from the right knee during this visit.    Regarding the left knee, he states it's very problematic, but both knees are causing issues. He has been receiving ozone injections in both knees from a practitioner named Shahrzad, which provide temporary pain relief lasting a few days.    Arik describes pain in his left hip and leg, which started about a month and a half ago. The pain extended from his hip down the left side of his leg but resolved after about a month. 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 A biopsy of the lipoma was performed and came back non-malignant.    He mentions a history of back problems, recalling instances where he experienced severe immobility and once had to call his son for help getting up off the floor. He attributes these issues to heavy weightlifting during his teenage years and 20s.    Arik reports a recent history of severe pneumonia following COVID-19, which led to a cardiology consultation. No cardiac issues were found, but a new cardiologist prescribed medication, which he declined to take.    He denies any recent falls or direct trauma to the knees. He also denies any allergies.    PREVIOUS TREATMENTS:  - Ozone shots in both knees: Provide  "pain relief for a few days  - Knee brace with a gel Huslia: Used    MEDICATIONS:  - Aleve: Never been effective for the patient  - Advil: Arik has avoided taking    ALLERGIES:  - Arik reports no known allergies. However, he had a reaction to contrast during an imaging procedure, experiencing severe nausea and dry heaving.      PAST MEDICAL HISTORY:    No past medical history on file.    Hemoglobin A1C   Date Value Ref Range Status   06/19/2021 5.9 <=6.5 % Final        PAST SURGICAL HISTORY:    Past Surgical History:   Procedure Laterality Date    HERNIA REPAIR          MEDICATIONS:    Current Outpatient Medications:     ascorbic acid,sod/zinc gluc,ox (ZINC AND C ORAL), Take by mouth., Disp: , Rfl:     ergocalciferol, vitamin D2, (VITAMIN D ORAL), Take by mouth Daily., Disp: , Rfl:     meloxicam (MOBIC) 15 MG tablet, Take 1 tablet (15 mg total) by mouth once daily. with meals, Disp: 14 tablet, Rfl: 1     There are no discontinued medications.      ALLERGIES:     Review of patient's allergies indicates:  No Known Allergies         FAMILY HISTORY:   No family history on file.        SOCIAL HISTORY:    Social History     Socioeconomic History    Marital status:    Tobacco Use    Smoking status: Never    Smokeless tobacco: Never       PHYSICAL EXAMINATION:     Estimated body mass index is 25.45 kg/m² as calculated from the following:    Height as of this encounter: 5' 6.5" (1.689 m).    Weight as of this encounter: 72.6 kg (160 lb 0.9 oz).   Deferred        IMAGING:      MRI Hip Without Contrast Left  Narrative: EXAMINATION:  MRI HIP WITHOUT CONTRAST LEFT    CLINICAL HISTORY:  Pain in left hipLipoma left hip;    TECHNIQUE:  Non-contrast multiplanar, multisequence MR images of the left hip were obtained on a 1.5  Manasa magnet.    COMPARISON:  02/10/2025.    FINDINGS:  Bones: No definitive evidence of infiltrating lesions.    Soft tissues: There is a 6 x 3.5 x  6.4 cm T1 hyperintense encapsulated lesion which " shows decreased signal on fat saturated images.  There is mild hypointensity in a nodular region along the periphery of the lesion measuring 1.5 x 0.8 cm.  Lesion is situated in the lateral subcutaneous soft tissues on the lateral aspect of the proximal vastus lateralis.    Miscellaneous: Along the anterior superior wall of the bladder there is a T1 isointense T2 hyperintense cystic lesion measuring 1.2 x 1 cm series 6, image 24).  Impression: 1. Fat suppressing lesion in the lateral soft tissues of the proximal thigh.  While these findings may correlate with lipoma.  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  Recommend completion of contrast enhanced images to assess for enhancement of this site.  2. Incidentally noted is a cystic nodularity along the anterior superior bladder may represent a irregular remnant.    Electronically signed by: Kodak Winters  Date:    02/20/2025  Time:    09:04           ASSESSMENT: 69 y.o. male  with:   Right knee pain worsened patellofemoral compartment status post steroid injection 02/10/2025 with some improvement.  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  Noncontrast MRI of left hip on 02/20/2025 demonstrates  1. Fat suppressing lesion in the lateral soft tissues of the proximal thigh.  While these findings may correlate with lipoma.  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  Recommend completion of contrast enhanced images to assess for enhancement of this site.  Incidentally noted is a cystic nodularity along the anterior superior bladder may represent a irregular remnant.  Left knee pain and arthritis with steroid injection 02/20/2025  Bilateral knee Synvisc series completed April 15, 2025    PLAN:  Data:  I reviewed available old/outside records  Medications:  Meloxicam as needed for pain.  DME and  weightbearing status:  Activities as tolerated.  He has a Shields knee brace to wear as needed for comfort for his right knee.  Injections/Procedures:  Bilateral knee Synvisc injections administered today. Patient tolerated the procedure well. He is a candidate for repeat visco injections in 6 months.   Education:  Medication side effects: Possible side effects of medications prescribed/continued today discussed. If patient experiences any unwanted side effects of medications prescribed, patient knows to notify physician immediately.   Return to clinic:  As needed.   Images needed at next visit:  None.

## 2025-04-15 ENCOUNTER — PROCEDURE VISIT (OUTPATIENT)
Dept: ORTHOPEDICS | Facility: CLINIC | Age: 70
End: 2025-04-15
Payer: MEDICARE

## 2025-04-15 VITALS — HEIGHT: 67 IN | BODY MASS INDEX: 25.12 KG/M2 | WEIGHT: 160.06 LBS

## 2025-04-15 DIAGNOSIS — M17.0 BILATERAL PRIMARY OSTEOARTHRITIS OF KNEE: Primary | ICD-10-CM

## 2025-04-15 PROCEDURE — 99499 UNLISTED E&M SERVICE: CPT | Mod: S$GLB,,,

## 2025-04-15 PROCEDURE — 20610 DRAIN/INJ JOINT/BURSA W/O US: CPT | Mod: 50,S$GLB,,

## 2025-04-15 NOTE — PROCEDURES
Large Joint Aspiration/Injection: bilateral knee    Date/Time: 4/15/2025 12:00 PM    Performed by: Jennifer Quinonez PA-C  Authorized by: Jennifer Quinonez PA-C    Consent Done?:  Yes (Verbal)  Indications:  Arthritis  Site marked: the procedure site was marked    Timeout: prior to procedure the correct patient, procedure, and site was verified    Local anesthesia used?: No      Details:  Needle Size:  22 G  Ultrasonic Guidance for needle placement?: No    Approach:  Anterolateral  Location:  Knee  Laterality:  Bilateral  Site:  Bilateral knee  Medications (Right):  16 mg hyaluronate 16 mg/2 mL  Medications (Left):  16 mg hyaluronate 16 mg/2 mL  Patient tolerance:  Patient tolerated the procedure well with no immediate complications

## 2025-09-05 ENCOUNTER — TELEPHONE (OUTPATIENT)
Dept: ORTHOPEDICS | Facility: CLINIC | Age: 70
End: 2025-09-05
Payer: MEDICARE